# Patient Record
Sex: MALE | Race: BLACK OR AFRICAN AMERICAN | NOT HISPANIC OR LATINO | Employment: UNEMPLOYED | URBAN - METROPOLITAN AREA
[De-identification: names, ages, dates, MRNs, and addresses within clinical notes are randomized per-mention and may not be internally consistent; named-entity substitution may affect disease eponyms.]

---

## 2022-02-03 ENCOUNTER — HISTORICAL (OUTPATIENT)
Dept: ADMINISTRATIVE | Facility: HOSPITAL | Age: 52
End: 2022-02-03

## 2022-02-18 ENCOUNTER — HISTORICAL (OUTPATIENT)
Dept: CARDIOLOGY | Facility: HOSPITAL | Age: 52
End: 2022-02-18

## 2022-02-18 ENCOUNTER — HISTORICAL (OUTPATIENT)
Dept: ADMINISTRATIVE | Facility: HOSPITAL | Age: 52
End: 2022-02-18

## 2022-02-18 LAB
ABS NEUT (OLG): 2.16 (ref 2.1–9.2)
BASOPHILS # BLD AUTO: 0 10*3/UL (ref 0–0.2)
BASOPHILS NFR BLD AUTO: 0 %
EOSINOPHIL # BLD AUTO: 0.2 10*3/UL (ref 0–0.9)
EOSINOPHIL NFR BLD AUTO: 4 %
ERYTHROCYTE [DISTWIDTH] IN BLOOD BY AUTOMATED COUNT: 14.7 % (ref 11.5–17)
HCT VFR BLD AUTO: 39.5 % (ref 42–52)
HGB BLD-MCNC: 13 G/DL (ref 14–18)
INR PPP: 1 (ref 0–1.3)
LYMPHOCYTES # BLD AUTO: 2 10*3/UL (ref 0.6–4.6)
LYMPHOCYTES NFR BLD AUTO: 40 %
MANUAL DIFF? (OHS): NO
MCH RBC QN AUTO: 28.5 PG (ref 27–31)
MCHC RBC AUTO-ENTMCNC: 32.9 G/DL (ref 33–36)
MCV RBC AUTO: 86.6 FL (ref 80–94)
MONOCYTES # BLD AUTO: 0.5 10*3/UL (ref 0.1–1.3)
MONOCYTES NFR BLD AUTO: 10 %
NEUTROPHILS # BLD AUTO: 2.16 10*3/UL (ref 2.1–9.2)
NEUTROPHILS NFR BLD AUTO: 44 %
PLATELET # BLD AUTO: 237 10*3/UL (ref 130–400)
PMV BLD AUTO: 10.1 FL (ref 9.4–12.4)
PROTHROMBIN TIME: 12.9 S (ref 12.5–14.5)
RBC # BLD AUTO: 4.56 10*6/UL (ref 4.7–6.1)
WBC # SPEC AUTO: 4.9 10*3/UL (ref 4.5–11.5)

## 2023-01-05 ENCOUNTER — HOSPITAL ENCOUNTER (EMERGENCY)
Facility: HOSPITAL | Age: 53
Discharge: HOME | End: 2023-01-05
Attending: EMERGENCY MEDICINE
Payer: MEDICAID

## 2023-01-05 VITALS
RESPIRATION RATE: 20 BRPM | SYSTOLIC BLOOD PRESSURE: 170 MMHG | OXYGEN SATURATION: 99 % | HEIGHT: 73 IN | WEIGHT: 229 LBS | TEMPERATURE: 98 F | BODY MASS INDEX: 30.35 KG/M2 | DIASTOLIC BLOOD PRESSURE: 123 MMHG | HEART RATE: 79 BPM

## 2023-01-05 DIAGNOSIS — M10.9 ACUTE GOUTY ARTHRITIS: Primary | ICD-10-CM

## 2023-01-05 PROCEDURE — 63600175 PHARM REV CODE 636 W HCPCS: Performed by: EMERGENCY MEDICINE

## 2023-01-05 PROCEDURE — 96372 THER/PROPH/DIAG INJ SC/IM: CPT | Performed by: EMERGENCY MEDICINE

## 2023-01-05 PROCEDURE — 99284 EMERGENCY DEPT VISIT MOD MDM: CPT | Mod: 25

## 2023-01-05 RX ORDER — COLCHICINE 0.6 MG/1
1.2 TABLET ORAL ONCE
Qty: 3 TABLET | Refills: 0 | Status: SHIPPED | OUTPATIENT
Start: 2023-01-05 | End: 2023-12-07 | Stop reason: SDUPTHER

## 2023-01-05 RX ORDER — DEXAMETHASONE SODIUM PHOSPHATE 4 MG/ML
8 INJECTION, SOLUTION INTRA-ARTICULAR; INTRALESIONAL; INTRAMUSCULAR; INTRAVENOUS; SOFT TISSUE
Status: COMPLETED | OUTPATIENT
Start: 2023-01-05 | End: 2023-01-05

## 2023-01-05 RX ADMIN — DEXAMETHASONE SODIUM PHOSPHATE 8 MG: 4 INJECTION, SOLUTION INTRA-ARTICULAR; INTRALESIONAL; INTRAMUSCULAR; INTRAVENOUS; SOFT TISSUE at 08:01

## 2023-01-05 NOTE — ED PROVIDER NOTES
Encounter Date: 1/5/2023       History     Chief Complaint   Patient presents with    Foot Pain     Pt to er c/o pain to right foot onset yesterday, denies injury.     The history is provided by the patient. No  was used.   Foot Pain  This is a recurrent problem. The current episode started yesterday. The problem occurs constantly. Pertinent negatives include no chest pain and no shortness of breath. The symptoms are aggravated by standing, bending and walking. Nothing relieves the symptoms.   Has h/o gout and feels similar.  Tried topical Voltaren without relief and Percocet that he had from recent LUE surgery, which also did not help.  Got slight relief with Aleve but states that he is not supposed to take NSAID's because of CKD.  Denies injury or trauma to foot - pain was present upon awakening yesterday AM.  States he has responded well to colchicine in the past.    Review of patient's allergies indicates:   Allergen Reactions    Lisinopril Shortness Of Breath     Past Medical History:   Diagnosis Date    Gout, unspecified     Hypertension     Renal disorder      No past surgical history on file.  Left wrist surgery  No family history on file.     Review of Systems   Constitutional:  Negative for fever.   HENT:  Negative for sore throat.    Respiratory:  Negative for shortness of breath.    Cardiovascular:  Negative for chest pain.   Gastrointestinal:  Negative for nausea.   Genitourinary:  Negative for dysuria.   Musculoskeletal:  Negative for back pain.   Skin:  Negative for rash.   Neurological:  Negative for weakness.   Hematological:  Does not bruise/bleed easily.     Physical Exam     Initial Vitals [01/05/23 0802]   BP Pulse Resp Temp SpO2   (!) 170/123 79 20 97.7 °F (36.5 °C) 99 %      MAP       --         Physical Exam    Nursing note and vitals reviewed.  Constitutional: He appears well-developed and well-nourished.   HENT:   Head: Normocephalic and atraumatic.   Right Ear: External  ear normal.   Left Ear: External ear normal.   Nose: Nose normal.   Eyes: Conjunctivae and EOM are normal. Pupils are equal, round, and reactive to light.   Neck: Neck supple.   Normal range of motion.  Cardiovascular:  Normal rate, regular rhythm, normal heart sounds and intact distal pulses.           Pulmonary/Chest: Breath sounds normal.   Abdominal: Abdomen is soft. Bowel sounds are normal.   Musculoskeletal:         General: Normal range of motion.      Cervical back: Normal range of motion and neck supple.        Feet:       Comments: Mild warmth, exquisitely TTP over dorsolateral right foot     Neurological: He is alert and oriented to person, place, and time. He has normal strength. GCS score is 15. GCS eye subscore is 4. GCS verbal subscore is 5. GCS motor subscore is 6.   Skin: Skin is warm and dry. Capillary refill takes less than 2 seconds.   Psychiatric: He has a normal mood and affect. His behavior is normal. Judgment and thought content normal.       ED Course   Procedures  Labs Reviewed - No data to display       Imaging Results    None     Atraumatic right foot pain with h/o gout.  Exam seems c/w this diagnosis.  Will avoid NSAID's due to stated h/o CKD.  Will give IM dexamethasone and start colchicine therapy, as he has responded favorably to this in the past.     Medications   dexAMETHasone injection 8 mg (has no administration in time range)                              Clinical Impression:   Final diagnoses:  [M10.9] Acute gouty arthritis (Primary)        ED Disposition Condition    Discharge Stable          ED Prescriptions       Medication Sig Dispense Start Date End Date Auth. Provider    colchicine (COLCRYS) 0.6 mg tablet (Expires today) Take 2 tablets (1.2 mg total) by mouth once. Then take 1 tablet one hour after initial dose. for 1 dose 3 tablet 1/5/2023 1/5/2023 Vince Sweet MD          Follow-up Information       Follow up With Specialties Details Why Contact Info    Follow  up with your primary MD in 3-5 days if not improved.  Return to ED for worsening symptoms.                 Vince Sweet MD  01/05/23 0822

## 2023-12-07 ENCOUNTER — HOSPITAL ENCOUNTER (EMERGENCY)
Facility: HOSPITAL | Age: 53
Discharge: HOME | End: 2023-12-07
Attending: STUDENT IN AN ORGANIZED HEALTH CARE EDUCATION/TRAINING PROGRAM
Payer: MEDICAID

## 2023-12-07 VITALS
RESPIRATION RATE: 18 BRPM | SYSTOLIC BLOOD PRESSURE: 199 MMHG | TEMPERATURE: 98 F | HEIGHT: 73 IN | HEART RATE: 82 BPM | WEIGHT: 230.63 LBS | DIASTOLIC BLOOD PRESSURE: 96 MMHG | OXYGEN SATURATION: 99 % | BODY MASS INDEX: 30.57 KG/M2

## 2023-12-07 DIAGNOSIS — M10.9 GOUTY ARTHRITIS OF RIGHT GREAT TOE: Primary | ICD-10-CM

## 2023-12-07 PROCEDURE — 96372 THER/PROPH/DIAG INJ SC/IM: CPT | Performed by: STUDENT IN AN ORGANIZED HEALTH CARE EDUCATION/TRAINING PROGRAM

## 2023-12-07 PROCEDURE — 99284 EMERGENCY DEPT VISIT MOD MDM: CPT

## 2023-12-07 PROCEDURE — 63600175 PHARM REV CODE 636 W HCPCS: Performed by: STUDENT IN AN ORGANIZED HEALTH CARE EDUCATION/TRAINING PROGRAM

## 2023-12-07 RX ORDER — DEXAMETHASONE SODIUM PHOSPHATE 4 MG/ML
8 INJECTION, SOLUTION INTRA-ARTICULAR; INTRALESIONAL; INTRAMUSCULAR; INTRAVENOUS; SOFT TISSUE
Status: COMPLETED | OUTPATIENT
Start: 2023-12-07 | End: 2023-12-07

## 2023-12-07 RX ORDER — INDOMETHACIN 25 MG/1
CAPSULE ORAL
Qty: 39 CAPSULE | Refills: 0 | Status: SHIPPED | OUTPATIENT
Start: 2023-12-07 | End: 2023-12-07 | Stop reason: CLARIF

## 2023-12-07 RX ORDER — COLCHICINE 0.6 MG/1
1.2 TABLET ORAL ONCE
Qty: 3 TABLET | Refills: 0 | Status: SHIPPED | OUTPATIENT
Start: 2023-12-07 | End: 2023-12-07

## 2023-12-07 RX ADMIN — DEXAMETHASONE SODIUM PHOSPHATE 8 MG: 4 INJECTION, SOLUTION INTRA-ARTICULAR; INTRALESIONAL; INTRAMUSCULAR; INTRAVENOUS; SOFT TISSUE at 06:12

## 2023-12-07 NOTE — ED PROVIDER NOTES
Encounter Date: 12/7/2023       History     Chief Complaint   Patient presents with    Gout     HPI    53-year-old male presents emergency department for gout flare to his right foot.  He is a history of hypertension and gout.  States that this is typical of his gout flare.  Proteins is flare.  No other complaints.  States he did take his blood pressure medicine today.    Review of patient's allergies indicates:   Allergen Reactions    Lisinopril Shortness Of Breath     Past Medical History:   Diagnosis Date    Gout, unspecified     Hypertension     Renal disorder      No past surgical history on file.  No family history on file.     Review of Systems   Constitutional:  Negative for fever.   HENT:  Negative for sore throat.    Respiratory:  Negative for cough and shortness of breath.    Cardiovascular:  Negative for chest pain.   Gastrointestinal:  Negative for abdominal pain, constipation, diarrhea, nausea and vomiting.   Genitourinary:  Negative for dysuria.   Musculoskeletal:  Positive for arthralgias and joint swelling. Negative for back pain.   Skin:  Negative for rash.   Neurological:  Negative for weakness and headaches.   Hematological:  Does not bruise/bleed easily.   All other systems reviewed and are negative.      Physical Exam     Initial Vitals [12/07/23 0551]   BP Pulse Resp Temp SpO2   (!) 199/96 82 18 97.9 °F (36.6 °C) 99 %      MAP       --         Physical Exam    Nursing note and vitals reviewed.  Constitutional: He appears well-developed and well-nourished. No distress.   Cardiovascular:  Normal rate and regular rhythm.           Pulmonary/Chest: Breath sounds normal. No respiratory distress.   Abdominal: Abdomen is soft. There is no abdominal tenderness.   Musculoskeletal:         General: Tenderness (tenderness to the right great toe with warmth.  Consistent with gout) present. Normal range of motion.     Neurological: He is alert and oriented to person, place, and time.   Skin: Skin is warm.  Capillary refill takes less than 2 seconds.         ED Course   Procedures  Labs Reviewed - No data to display       Imaging Results    None          Medications   dexAMETHasone injection 8 mg (has no administration in time range)     Medical Decision Making  differential diagnosis  Gout, arthritis, cellulitis, septic arthritis,  as well as multiple other possible etiologies      Patient's signs and symptoms consistent with his gout.  Will treat accordingly.  Will use colchicine as he has supposed mild CKD    Problems Addressed:  Gouty arthritis of right great toe: chronic illness or injury with exacerbation, progression, or side effects of treatment    Amount and/or Complexity of Data Reviewed  External Data Reviewed: labs and notes.    Risk  Prescription drug management.                                      Clinical Impression:  Final diagnoses:  [M10.9] Gouty arthritis of right great toe (Primary)          ED Disposition Condition    Discharge Stable          ED Prescriptions       Medication Sig Dispense Start Date End Date Auth. Provider    indomethacin (INDOCIN) 25 MG capsule  (Status: Discontinued) Take 2 capsules (50 mg total) by mouth 3 (three) times daily for 3 days, THEN 1 capsule (25 mg total) 3 (three) times daily for 7 days. 39 capsule 12/7/2023 12/7/2023 Patel Parish MD    colchicine (COLCRYS) 0.6 mg tablet (Expires today) Take 2 tablets (1.2 mg total) by mouth once. Then take 1 tablet one hour after initial dose. for 1 dose 3 tablet 12/7/2023 12/7/2023 Patel Parish MD          Follow-up Information       Follow up With Specialties Details Why Contact Info    [facility] - Emergency Dept Emergency Medicine Go to  If symptoms worsen [id number]  [facility] [id number] [phone]             Patel Parish MD  12/07/23 0605

## 2024-01-05 ENCOUNTER — HOSPITAL ENCOUNTER (EMERGENCY)
Facility: HOSPITAL | Age: 54
Discharge: HOME | End: 2024-01-05
Attending: EMERGENCY MEDICINE
Payer: MEDICAID

## 2024-01-05 VITALS
RESPIRATION RATE: 16 BRPM | HEART RATE: 81 BPM | SYSTOLIC BLOOD PRESSURE: 158 MMHG | TEMPERATURE: 97 F | BODY MASS INDEX: 29.7 KG/M2 | WEIGHT: 225.06 LBS | DIASTOLIC BLOOD PRESSURE: 97 MMHG | OXYGEN SATURATION: 100 %

## 2024-01-05 DIAGNOSIS — R51.9 HEADACHE: ICD-10-CM

## 2024-01-05 DIAGNOSIS — G43.909 MIGRAINE WITHOUT STATUS MIGRAINOSUS, NOT INTRACTABLE, UNSPECIFIED MIGRAINE TYPE: ICD-10-CM

## 2024-01-05 DIAGNOSIS — I10 HYPERTENSION, UNSPECIFIED TYPE: ICD-10-CM

## 2024-01-05 DIAGNOSIS — R51.9 ACUTE NONINTRACTABLE HEADACHE, UNSPECIFIED HEADACHE TYPE: Primary | ICD-10-CM

## 2024-01-05 LAB
ALBUMIN SERPL-MCNC: 3.7 G/DL (ref 3.5–5)
ALBUMIN/GLOB SERPL: 0.9 RATIO (ref 1.1–2)
ALP SERPL-CCNC: 66 UNIT/L (ref 40–150)
ALT SERPL-CCNC: 13 UNIT/L (ref 0–55)
AST SERPL-CCNC: 11 UNIT/L (ref 5–34)
BASOPHILS # BLD AUTO: 0.02 X10(3)/MCL
BASOPHILS NFR BLD AUTO: 0.3 %
BILIRUB SERPL-MCNC: 0.2 MG/DL
BUN SERPL-MCNC: 38.1 MG/DL (ref 8.4–25.7)
CALCIUM SERPL-MCNC: 9.4 MG/DL (ref 8.4–10.2)
CHLORIDE SERPL-SCNC: 107 MMOL/L (ref 98–107)
CO2 SERPL-SCNC: 25 MMOL/L (ref 22–29)
CREAT SERPL-MCNC: 3.07 MG/DL (ref 0.73–1.18)
EOSINOPHIL # BLD AUTO: 0.22 X10(3)/MCL (ref 0–0.9)
EOSINOPHIL NFR BLD AUTO: 3.7 %
ERYTHROCYTE [DISTWIDTH] IN BLOOD BY AUTOMATED COUNT: 14.7 % (ref 11.5–17)
GFR SERPLBLD CREATININE-BSD FMLA CKD-EPI: 24 MLS/MIN/1.73/M2
GLOBULIN SER-MCNC: 4.2 GM/DL (ref 2.4–3.5)
GLUCOSE SERPL-MCNC: 94 MG/DL (ref 74–100)
HCT VFR BLD AUTO: 40.1 % (ref 42–52)
HGB BLD-MCNC: 13.1 G/DL (ref 14–18)
IMM GRANULOCYTES # BLD AUTO: 0.01 X10(3)/MCL (ref 0–0.04)
IMM GRANULOCYTES NFR BLD AUTO: 0.2 %
INR PPP: 0.9
LYMPHOCYTES # BLD AUTO: 2.23 X10(3)/MCL (ref 0.6–4.6)
LYMPHOCYTES NFR BLD AUTO: 37.2 %
MCH RBC QN AUTO: 27.9 PG (ref 27–31)
MCHC RBC AUTO-ENTMCNC: 32.7 G/DL (ref 33–36)
MCV RBC AUTO: 85.5 FL (ref 80–94)
MONOCYTES # BLD AUTO: 0.5 X10(3)/MCL (ref 0.1–1.3)
MONOCYTES NFR BLD AUTO: 8.3 %
NEUTROPHILS # BLD AUTO: 3.02 X10(3)/MCL (ref 2.1–9.2)
NEUTROPHILS NFR BLD AUTO: 50.3 %
NRBC BLD AUTO-RTO: 0 %
PLATELET # BLD AUTO: 223 X10(3)/MCL (ref 130–400)
PMV BLD AUTO: 9.5 FL (ref 7.4–10.4)
POTASSIUM SERPL-SCNC: 3.7 MMOL/L (ref 3.5–5.1)
PROT SERPL-MCNC: 7.9 GM/DL (ref 6.4–8.3)
PROTHROMBIN TIME: 11.9 SECONDS (ref 12.5–14.5)
RBC # BLD AUTO: 4.69 X10(6)/MCL (ref 4.7–6.1)
SODIUM SERPL-SCNC: 141 MMOL/L (ref 136–145)
WBC # SPEC AUTO: 6 X10(3)/MCL (ref 4.5–11.5)

## 2024-01-05 PROCEDURE — 99285 EMERGENCY DEPT VISIT HI MDM: CPT | Mod: 25

## 2024-01-05 PROCEDURE — 80053 COMPREHEN METABOLIC PANEL: CPT | Performed by: NURSE PRACTITIONER

## 2024-01-05 PROCEDURE — 96375 TX/PRO/DX INJ NEW DRUG ADDON: CPT

## 2024-01-05 PROCEDURE — 93005 ELECTROCARDIOGRAM TRACING: CPT

## 2024-01-05 PROCEDURE — 63600175 PHARM REV CODE 636 W HCPCS: Performed by: NURSE PRACTITIONER

## 2024-01-05 PROCEDURE — 25000003 PHARM REV CODE 250

## 2024-01-05 PROCEDURE — 85610 PROTHROMBIN TIME: CPT | Performed by: NURSE PRACTITIONER

## 2024-01-05 PROCEDURE — 96374 THER/PROPH/DIAG INJ IV PUSH: CPT

## 2024-01-05 PROCEDURE — 63600175 PHARM REV CODE 636 W HCPCS

## 2024-01-05 PROCEDURE — 93010 ELECTROCARDIOGRAM REPORT: CPT | Mod: ,,, | Performed by: INTERNAL MEDICINE

## 2024-01-05 PROCEDURE — 85025 COMPLETE CBC W/AUTO DIFF WBC: CPT | Performed by: NURSE PRACTITIONER

## 2024-01-05 RX ORDER — BUTALBITAL, ACETAMINOPHEN AND CAFFEINE 50; 325; 40 MG/1; MG/1; MG/1
1 TABLET ORAL EVERY 4 HOURS PRN
Qty: 10 TABLET | Refills: 0 | Status: SHIPPED | OUTPATIENT
Start: 2024-01-05

## 2024-01-05 RX ORDER — BUTALBITAL, ACETAMINOPHEN AND CAFFEINE 50; 325; 40 MG/1; MG/1; MG/1
1 TABLET ORAL
Status: COMPLETED | OUTPATIENT
Start: 2024-01-05 | End: 2024-01-05

## 2024-01-05 RX ORDER — DIPHENHYDRAMINE HYDROCHLORIDE 50 MG/ML
12.5 INJECTION INTRAMUSCULAR; INTRAVENOUS
Status: COMPLETED | OUTPATIENT
Start: 2024-01-05 | End: 2024-01-05

## 2024-01-05 RX ORDER — METOCLOPRAMIDE HYDROCHLORIDE 5 MG/ML
5 INJECTION INTRAMUSCULAR; INTRAVENOUS
Status: COMPLETED | OUTPATIENT
Start: 2024-01-05 | End: 2024-01-05

## 2024-01-05 RX ORDER — HYDRALAZINE HYDROCHLORIDE 20 MG/ML
10 INJECTION INTRAMUSCULAR; INTRAVENOUS
Status: COMPLETED | OUTPATIENT
Start: 2024-01-05 | End: 2024-01-05

## 2024-01-05 RX ADMIN — DIPHENHYDRAMINE HYDROCHLORIDE 12.5 MG: 50 INJECTION INTRAMUSCULAR; INTRAVENOUS at 05:01

## 2024-01-05 RX ADMIN — METOCLOPRAMIDE 5 MG: 5 INJECTION, SOLUTION INTRAMUSCULAR; INTRAVENOUS at 05:01

## 2024-01-05 RX ADMIN — BUTALBITAL, ACETAMINOPHEN, AND CAFFEINE 1 TABLET: 50; 325; 40 TABLET ORAL at 06:01

## 2024-01-05 RX ADMIN — HYDRALAZINE HYDROCHLORIDE 10 MG: 20 INJECTION INTRAMUSCULAR; INTRAVENOUS at 05:01

## 2024-01-05 NOTE — ED PROVIDER NOTES
Encounter Date: 1/5/2024       History     Chief Complaint   Patient presents with    Headache     Patient complaining of localized headache after waking up from a nap this afternoon. Complaining of dizziness and blurred vision. VAN negative. Hx of HTN. aaox4     HPI  53-year-old male with a history of uncontrolled HTN presents to the ED with localized HA posterior to right eye that began after waking from a nap this afternoon and is associated with dizziness and sensitivity to light.  BP on arrival was 190/110.  Patient does compliant with home meds including Coreg, hydralazine, amlodipine, valsartan.  He recently decreased his hydralazine dose to 25 mg on his own because his SBP was in the 120s and he believes he did not need a high dose any longer.  Denies focal neurologic symptoms, confusion, fever/chills, n/v, SOB, chest pain, palpitations.    Review of patient's allergies indicates:   Allergen Reactions    Lisinopril Shortness Of Breath     Past Medical History:   Diagnosis Date    Gout, unspecified     Hypertension     Renal disorder      History reviewed. No pertinent surgical history.  History reviewed. No pertinent family history.     Review of Systems   Constitutional:  Negative for chills, diaphoresis and fever.   Eyes:  Positive for photophobia. Negative for visual disturbance.   Respiratory:  Negative for shortness of breath.    Cardiovascular:  Negative for chest pain and palpitations.   Gastrointestinal:  Negative for abdominal pain, nausea and vomiting.   Musculoskeletal:  Negative for gait problem, neck pain and neck stiffness.   Neurological:  Positive for dizziness and headaches. Negative for syncope and light-headedness.       Physical Exam     Initial Vitals [01/05/24 1539]   BP Pulse Resp Temp SpO2   (!) 190/110 75 20 96.8 °F (36 °C) 99 %      MAP       --         Physical Exam    Constitutional: He appears well-developed and well-nourished.   HENT:   Head: Normocephalic and atraumatic.    Mouth/Throat: Oropharynx is clear and moist.   Eyes: Conjunctivae and EOM are normal. Pupils are equal, round, and reactive to light.   Pupils 3 mm and equal bilaterally.   Cardiovascular:  Normal rate and regular rhythm.     Exam reveals no gallop and no friction rub.       No murmur heard.  Pulmonary/Chest: Breath sounds normal. He has no wheezes. He has no rhonchi. He has no rales.   Abdominal: Abdomen is soft. He exhibits no distension. There is no abdominal tenderness.   Musculoskeletal:         General: Normal range of motion.     Neurological: He is alert and oriented to person, place, and time. He has normal strength. No cranial nerve deficit or sensory deficit. GCS eye subscore is 4. GCS verbal subscore is 5. GCS motor subscore is 6.   Strength 5/5 in bilateral upper and lower extremities.  Sensation intact and equal throughout.  No pronator drift.  Finger-to-nose normal.  No dysdiadochokinesia   Skin: Skin is warm and dry. Capillary refill takes less than 2 seconds.   Psychiatric: He has a normal mood and affect.         ED Course   Procedures  Labs Reviewed   COMPREHENSIVE METABOLIC PANEL - Abnormal; Notable for the following components:       Result Value    Blood Urea Nitrogen 38.1 (*)     Creatinine 3.07 (*)     Globulin 4.2 (*)     Albumin/Globulin Ratio 0.9 (*)     All other components within normal limits   PROTIME-INR - Abnormal; Notable for the following components:    PT 11.9 (*)     All other components within normal limits   CBC WITH DIFFERENTIAL - Abnormal; Notable for the following components:    RBC 4.69 (*)     Hgb 13.1 (*)     Hct 40.1 (*)     MCHC 32.7 (*)     All other components within normal limits   CBC W/ AUTO DIFFERENTIAL    Narrative:     The following orders were created for panel order CBC Auto Differential.  Procedure                               Abnormality         Status                     ---------                               -----------         ------                      CBC with Differential[[id number]]        Abnormal            Final result                 Please view results for these tests on the individual orders.        ECG Results              EKG 12-lead (Final result)  Result time 01/05/24 17:59:51      Final result by Interface, Lab In [facility] (01/05/24 17:59:51)                   Narrative:    Test Reason : R51.9,    Vent. Rate : 068 BPM     Atrial Rate : 068 BPM     P-R Int : 168 ms          QRS Dur : 102 ms      QT Int : 416 ms       P-R-T Axes : 044 011 213 degrees     QTc Int : 442 ms    Normal sinus rhythm  Moderate voltage criteria for LVH, may be normal variant ( R in aVL ,   Garrett product )  T wave abnormality, consider inferolateral ischemia  Abnormal ECG  No previous ECGs available  Confirmed by Gamal Motley MD ([id number]) on 1/5/2024 5:59:40 PM    Referred By:             Confirmed By:Gamal Motley MD                                  Imaging Results              CT Head Without Contrast (Final result)  Result time 01/05/24 16:42:44      Final result by Sanjeev Sanchez MD (01/05/24 16:42:44)                   Impression:      No acute intracranial findings identified.      Electronically signed by: Sanjeev Sanchez  Date:    01/05/2024  Time:    16:42               Narrative:    EXAMINATION:  CT HEAD WITHOUT CONTRAST    CLINICAL HISTORY:  Headache, sudden, severe;    TECHNIQUE:  Sequential axial images were performed of the brain without contrast.    Dose product length of 950 mGycm.  Automated exposure control was utilized to minimize radiation dose.    COMPARISON:  February 3, 2022.    FINDINGS:  There is no intracranial mass effect, midline shift, hydrocephalus or hemorrhage. There is no sulcal effacement or low attenuation changes to suggest recent large vessel territory infarction.  Bilateral periventricular and subcortical white matter highly nonspecific  patchy opacities are about similar and are suggested to represent chronic microvascular ischemia and  these findings are more advanced for the patient's age.  Differential possibilities include vasculitis and demyelination.  There is no acute extra axial fluid collection. Visualized paranasal sinuses are clear without mucosal thickening, polypoidal abnormality or air-fluid levels. Mastoid air cells aeration is optimal.                                       Medications   metoclopramide injection 5 mg (5 mg Intravenous Given 1/5/24 1729)   diphenhydrAMINE injection 12.5 mg (12.5 mg Intravenous Given 1/5/24 1728)   hydrALAZINE injection 10 mg (10 mg Intravenous Given 1/5/24 1753)   butalbital-acetaminophen-caffeine -40 mg per tablet 1 tablet (1 tablet Oral Given 1/5/24 1837)     Medical Decision Making    ED assessment:    54 yo male presenting with acute onset unilateral headache sensitive to light and BP of 190/110.    Differential diagnosis (including but not limited to):   TIA, migraine, HTN, tension headache    ED management: Refer to ED course.    Amount and/or Complexity of Data Reviewed  Independent Historian: spouse     Details: Spouse states that patient's last HA was approximately 2 years ago where he was diagnosed with a migraine.  Fioricet provided relief at home.  External Data Reviewed: labs.     Details: Labs under media tab showed creatinine greater than 3 at baseline.  Labs: ordered.  Radiology: ordered.  ECG/medicine tests: ordered.    Risk  OTC drugs.  Prescription drug management.               ED Course as of 01/05/24 2031 Fri Jan 05, 2024 1750 Patient presented with unilateral HA onset this afternoon and HTN with /110.  He denies having unilateral sensory or motor deficits at anytime.  CT head negative for acute abnormalities.  He does seem to have microvascular ischemic changes which are reported to be more advanced than expected at patient's age indicative of longstanding uncontrolled HTN.  On labs his creatinine is 3.07 which is at baseline per chart review.  He was given  hydralazine 10 mg IV with positive BP response of 142/86. Will continue to monitor for changes in HA. [BB]   2029 HA and relief after BP control established.  Administered Fioricet and discharged home with Rx for Fioricet.  Return ED precautions discussed [BB]      ED Course User Index  [BB] Melchor Desai MD                           Clinical Impression:  Final diagnoses:  [R51.9] Acute nonintractable headache, unspecified headache type (Primary)  [G43.909] Migraine without status migrainosus, not intractable, unspecified migraine type  [I10] Hypertension, unspecified type          ED Disposition Condition    Discharge Stable          ED Prescriptions       Medication Sig Dispense Start Date End Date Auth. Provider    butalbital-acetaminophen-caffeine -40 mg (FIORICET, ESGIC) -40 mg per tablet Take 1 tablet by mouth every 4 (four) hours as needed for Pain. 10 tablet 1/5/2024 -- Melchor Desai MD          Follow-up Information       Follow up With Specialties Details Why Contact Info    [facility] - Emergency Dept Emergency Medicine Go to  As needed, If symptoms worsen [address] [facility] [id number] [phone]    CITLALY Jerome  Schedule an appointment as soon as possible for a visit                Melchor Desai MD  Resident  01/05/24 2031

## 2024-01-05 NOTE — FIRST PROVIDER EVALUATION
Medical screening examination initiated.  I have conducted a focused provider triage encounter, findings are as follows:    Brief history of present illness:  Patient states headache, dizziness, and blurred vision starting this afternoon.     Vitals:    01/05/24 1539   BP: (!) 190/110   Pulse: 75   Resp: 20   Temp: 96.8 °F (36 °C)   TempSrc: Oral   SpO2: 99%   Weight: 102.1 kg (225 lb 1.4 oz)       Pertinent physical exam:  Awake, alert, ambulatory    Brief workup plan:  Labs, Imaging    Preliminary workup initiated; this workup will be continued and followed by the physician or advanced practice provider that is assigned to the patient when roomed.   WDL

## 2024-01-06 NOTE — ED PROVIDER NOTES
Encounter Date: 1/5/2024       History     Chief Complaint   Patient presents with    Headache     Patient complaining of localized headache after waking up from a nap this afternoon. Complaining of dizziness and blurred vision. VAN negative. Hx of HTN. aaox4     HPI  Review of patient's allergies indicates:   Allergen Reactions    Lisinopril Shortness Of Breath     Past Medical History:   Diagnosis Date    Gout, unspecified     Hypertension     Renal disorder      History reviewed. No pertinent surgical history.  History reviewed. No pertinent family history.     Review of Systems    Physical Exam     Initial Vitals [01/05/24 1539]   BP Pulse Resp Temp SpO2   (!) 190/110 75 20 96.8 °F (36 °C) 99 %      MAP       --         Physical Exam    ED Course   Procedures  Labs Reviewed   COMPREHENSIVE METABOLIC PANEL - Abnormal; Notable for the following components:       Result Value    Blood Urea Nitrogen 38.1 (*)     Creatinine 3.07 (*)     Globulin 4.2 (*)     Albumin/Globulin Ratio 0.9 (*)     All other components within normal limits   PROTIME-INR - Abnormal; Notable for the following components:    PT 11.9 (*)     All other components within normal limits   CBC WITH DIFFERENTIAL - Abnormal; Notable for the following components:    RBC 4.69 (*)     Hgb 13.1 (*)     Hct 40.1 (*)     MCHC 32.7 (*)     All other components within normal limits   CBC W/ AUTO DIFFERENTIAL    Narrative:     The following orders were created for panel order CBC Auto Differential.  Procedure                               Abnormality         Status                     ---------                               -----------         ------                     CBC with Differential[[id number]]        Abnormal            Final result                 Please view results for these tests on the individual orders.        ECG Results              EKG 12-lead (Final result)  Result time 01/05/24 17:59:51      Final result by Interface, Lab In [facility]  (01/05/24 17:59:51)                   Narrative:    Test Reason : R51.9,    Vent. Rate : 068 BPM     Atrial Rate : 068 BPM     P-R Int : 168 ms          QRS Dur : 102 ms      QT Int : 416 ms       P-R-T Axes : 044 011 213 degrees     QTc Int : 442 ms    Normal sinus rhythm  Moderate voltage criteria for LVH, may be normal variant ( R in aVL ,   Asim product )  T wave abnormality, consider inferolateral ischemia  Abnormal ECG  No previous ECGs available  Confirmed by Gamal Motley MD ([id number]) on 1/5/2024 5:59:40 PM    Referred By:             Confirmed By:Gamal Motley MD                                     EKG 12-LEAD (Final result)  Result time 01/09/24 14:51:22      Final result by Unknown User (01/09/24 14:51:22)                                      Imaging Results              CT Head Without Contrast (Final result)  Result time 01/05/24 16:42:44      Final result by Sanjeev Sanchez MD (01/05/24 16:42:44)                   Impression:      No acute intracranial findings identified.      Electronically signed by: Sanjeev Sanchez  Date:    01/05/2024  Time:    16:42               Narrative:    EXAMINATION:  CT HEAD WITHOUT CONTRAST    CLINICAL HISTORY:  Headache, sudden, severe;    TECHNIQUE:  Sequential axial images were performed of the brain without contrast.    Dose product length of 950 mGycm.  Automated exposure control was utilized to minimize radiation dose.    COMPARISON:  February 3, 2022.    FINDINGS:  There is no intracranial mass effect, midline shift, hydrocephalus or hemorrhage. There is no sulcal effacement or low attenuation changes to suggest recent large vessel territory infarction.  Bilateral periventricular and subcortical white matter highly nonspecific  patchy opacities are about similar and are suggested to represent chronic microvascular ischemia and these findings are more advanced for the patient's age.  Differential possibilities include vasculitis and demyelination.  There is no acute  extra axial fluid collection. Visualized paranasal sinuses are clear without mucosal thickening, polypoidal abnormality or air-fluid levels. Mastoid air cells aeration is optimal.                                       Medications   metoclopramide injection 5 mg (5 mg Intravenous Given 1/5/24 1729)   diphenhydrAMINE injection 12.5 mg (12.5 mg Intravenous Given 1/5/24 1728)   hydrALAZINE injection 10 mg (10 mg Intravenous Given 1/5/24 1753)   butalbital-acetaminophen-caffeine -40 mg per tablet 1 tablet (1 tablet Oral Given 1/5/24 1837)     Medical Decision Making  Risk  Prescription drug management.              Attending Attestation:   Physician Attestation Statement for Resident:  As the supervising MD   Physician Attestation Statement: I have personally seen and examined this patient.   I agree with the above history.  -:   As the supervising MD I agree with the above PE.     As the supervising MD I agree with the above treatment, course, plan, and disposition.   -: Patient awoke with right forehead/frontal headache, initially with some blurry vision that is subsequently resolved with the headache persisted.  Denies global headache, worse headache of life, fever chills trouble speaking, trouble swallowing, numbness, tingling or weakness to his face or extremities.  On presentation patient very hypertensive, had recently decreased his hydralazine.  CT of the head, laboratory workup for end-organ damage was unremarkable.  Patient given hydralazine with improvement of his blood pressure, but still with the headache.  Intra-ocular pressure within normal limits, patient will be treated symptomatically, discharged to follow up with PCP as an outpatient.                   ED Course as of 01/10/24 0601   Fri Jan 05, 2024   1750 Patient presented with unilateral HA onset this afternoon and HTN with /110.  He denies having unilateral sensory or motor deficits at anytime.  CT head negative for acute abnormalities.   He does seem to have microvascular ischemic changes which are reported to be more advanced than expected at patient's age indicative of longstanding uncontrolled HTN.  On labs his creatinine is 3.07 which is at baseline per chart review.  He was given hydralazine 10 mg IV with positive BP response of 142/86. Will continue to monitor for changes in HA. [BB]   2029 HA and relief after BP control established.  Administered Fioricet and discharged home with Rx for Fioricet.  Return ED precautions discussed [BB]      ED Course User Index  [BB] Melchor Desai MD                           Clinical Impression:  Final diagnoses:  [R51.9] Acute nonintractable headache, unspecified headache type (Primary)  [G43.909] Migraine without status migrainosus, not intractable, unspecified migraine type  [I10] Hypertension, unspecified type          ED Disposition Condition    Discharge Stable          ED Prescriptions       Medication Sig Dispense Start Date End Date Auth. Provider    butalbital-acetaminophen-caffeine -40 mg (FIORICET, ESGIC) -40 mg per tablet Take 1 tablet by mouth every 4 (four) hours as needed for Pain. 10 tablet 1/5/2024 -- Melchor Desai MD          Follow-up Information       Follow up With Specialties Details Why Contact Info    [facility] - Emergency Dept Emergency Medicine Go to  As needed, If symptoms worsen [address] [facility] [id number] [phone]    CITLAL[facility]  Schedule an appointment as soon as possible for a visit                Kamran Hein MD  01/05/24 1915       Kamran Hein MD  01/10/24 0601